# Patient Record
Sex: FEMALE | Race: WHITE | NOT HISPANIC OR LATINO | Employment: OTHER | URBAN - METROPOLITAN AREA
[De-identification: names, ages, dates, MRNs, and addresses within clinical notes are randomized per-mention and may not be internally consistent; named-entity substitution may affect disease eponyms.]

---

## 2019-11-05 ENCOUNTER — OFFICE VISIT (OUTPATIENT)
Dept: UROLOGY | Facility: CLINIC | Age: 79
End: 2019-11-05
Payer: MEDICARE

## 2019-11-05 VITALS
HEIGHT: 67 IN | SYSTOLIC BLOOD PRESSURE: 104 MMHG | WEIGHT: 137 LBS | HEART RATE: 78 BPM | BODY MASS INDEX: 21.5 KG/M2 | DIASTOLIC BLOOD PRESSURE: 58 MMHG | RESPIRATION RATE: 12 BRPM

## 2019-11-05 DIAGNOSIS — N39.46 MIXED INCONTINENCE: ICD-10-CM

## 2019-11-05 DIAGNOSIS — N28.1 PARAPELVIC RENAL CYST: ICD-10-CM

## 2019-11-05 DIAGNOSIS — N20.0 KIDNEY STONE: ICD-10-CM

## 2019-11-05 PROCEDURE — 99214 OFFICE O/P EST MOD 30 MIN: CPT | Mod: S$GLB,,, | Performed by: NURSE PRACTITIONER

## 2019-11-05 PROCEDURE — 99214 PR OFFICE/OUTPT VISIT, EST, LEVL IV, 30-39 MIN: ICD-10-PCS | Mod: S$GLB,,, | Performed by: NURSE PRACTITIONER

## 2019-11-05 RX ORDER — LANOLIN ALCOHOL/MO/W.PET/CERES
250 CREAM (GRAM) TOPICAL NIGHTLY
COMMUNITY

## 2019-11-05 RX ORDER — GLIMEPIRIDE 4 MG/1
TABLET ORAL
COMMUNITY
Start: 2019-10-30

## 2019-11-05 RX ORDER — BLOOD SUGAR DIAGNOSTIC
STRIP MISCELLANEOUS
Refills: 3 | COMMUNITY
Start: 2019-09-27

## 2019-11-05 RX ORDER — LISINOPRIL 10 MG/1
TABLET ORAL
COMMUNITY
Start: 2019-09-27

## 2019-11-05 RX ORDER — METFORMIN HYDROCHLORIDE 1000 MG/1
TABLET ORAL
COMMUNITY
Start: 2019-09-27

## 2019-11-05 NOTE — ASSESSMENT & PLAN NOTE
Asymptomatic, nonobstructing on last CT scan.  Re-evaluate on CT scan ordered for parapelvic cyst; see order above

## 2019-11-05 NOTE — PROGRESS NOTES
Subjective:       Patient ID: Spring Delcid is a 79 y.o. female.    Chief Complaint: No chief complaint on file.      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  She has been asymptomatic since last evaluation.  She has been followed with CT scans.  Last a valve direct several cm cyst.  She has mixed incontinence with the worst part being urgency incontinence.  She was given a trial dose of myrbertiq which caused instability in a for blood sugar as well as high blood pressure, she discontinued medication.  She states her has been no worsening of her symptoms of the mixed incontinence and describes it as mild.  She is satisfied as  is at this time.  She has some nonobstructing stones are found on CT scan in August of 2017 she remains asymptomatic of stone disease      Past Medical History:   Past Medical History:   Diagnosis Date    Diabetes mellitus     Hypertension        Past Surgical Historical:   Past Surgical History:   Procedure Laterality Date    BLADDER SURGERY      CHOLECYSTECTOMY      x 2(per pt)    HYSTERECTOMY      partial then years later completed    ROTATOR CUFF REPAIR          Medications:   Medication List with Changes/Refills   Current Medications    ACCU-CHEK ROBI PLUS TEST STRP STRP    TEST TWICE DAILY    ASPIRIN (ASPIR-81 ORAL)    Aspir-81    BLOOD SUGAR DIAGNOSTIC (BLOOD GLUCOSE TEST) STRP    Accu-Chek Robi Plus test strips   TEST BID AS DIRECTED    FLUZONE HIGH-DOSE 2019-20, PF, 180 MCG/0.5 ML SYRG    PHARMACIST ADMINISTERED IMMUNIZATION ADMINISTERED AT TIME OF DISPENSING    GLIMEPIRIDE (AMARYL) 4 MG TABLET        LACTOBACILLUS RHAMNOSUS GG (CULTURELLE) 10 BILLION CELL CAPSULE    Take 1 capsule by mouth once daily.    LISINOPRIL 10 MG TABLET        METFORMIN (GLUCOPHAGE) 1000 MG TABLET        NIACIN 250 MG CPSR    Take 250 mg by mouth every evening.    OMEGA 3-DHA-EPA-FISH OIL (FISH OIL) 100-160-1,000 MG CAP    Fish Oil         Past Social History:   Social History     Socioeconomic History    Marital status:      Spouse name: Not on file    Number of children: Not on file    Years of education: Not on file    Highest education level: Not on file   Occupational History    Not on file   Social Needs    Financial resource strain: Not on file    Food insecurity:     Worry: Not on file     Inability: Not on file    Transportation needs:     Medical: Not on file     Non-medical: Not on file   Tobacco Use    Smoking status: Former Smoker    Smokeless tobacco: Never Used   Substance and Sexual Activity    Alcohol use: Never     Frequency: Never    Drug use: Never    Sexual activity: Not Currently   Lifestyle    Physical activity:     Days per week: Not on file     Minutes per session: Not on file    Stress: Not on file   Relationships    Social connections:     Talks on phone: Not on file     Gets together: Not on file     Attends Congregation service: Not on file     Active member of club or organization: Not on file     Attends meetings of clubs or organizations: Not on file     Relationship status: Not on file   Other Topics Concern    Not on file   Social History Narrative    Not on file       Allergies: Review of patient's allergies indicates:  No Known Allergies     Family History: History reviewed. No pertinent family history.     Review of Systems:  Review of Systems    Physical Exam:  Physical Exam    Assessment/Plan:       Problem List Items Addressed This Visit        Renal/    Parapelvic renal cyst    Current Assessment & Plan     Asymptomatic, repeat CT scan with contrast for re-evaluation of right peripelvic cyst         Kidney stone    Current Assessment & Plan     Asymptomatic, nonobstructing on last CT scan.  Re-evaluate on CT scan ordered for parapelvic cyst; see order above             Mixed incontinence    Current Assessment & Plan     Patient states no progression, satisfied as is declines any  further workup or intervention at this time

## 2019-11-05 NOTE — ASSESSMENT & PLAN NOTE
Patient states no progression, satisfied as is declines any further workup or intervention at this time